# Patient Record
Sex: MALE | Race: WHITE | URBAN - METROPOLITAN AREA
[De-identification: names, ages, dates, MRNs, and addresses within clinical notes are randomized per-mention and may not be internally consistent; named-entity substitution may affect disease eponyms.]

---

## 2021-05-24 ENCOUNTER — APPOINTMENT (RX ONLY)
Dept: URBAN - METROPOLITAN AREA CLINIC 312 | Facility: CLINIC | Age: 66
Setting detail: DERMATOLOGY
End: 2021-05-24

## 2021-05-24 DIAGNOSIS — Z85.828 PERSONAL HISTORY OF OTHER MALIGNANT NEOPLASM OF SKIN: ICD-10-CM

## 2021-05-24 DIAGNOSIS — L90.5 SCAR CONDITIONS AND FIBROSIS OF SKIN: ICD-10-CM

## 2021-05-24 DIAGNOSIS — Z85.820 PERSONAL HISTORY OF MALIGNANT MELANOMA OF SKIN: ICD-10-CM

## 2021-05-24 DIAGNOSIS — L82.1 OTHER SEBORRHEIC KERATOSIS: ICD-10-CM

## 2021-05-24 DIAGNOSIS — L57.0 ACTINIC KERATOSIS: ICD-10-CM

## 2021-05-24 PROBLEM — C44.719 BASAL CELL CARCINOMA OF SKIN OF LEFT LOWER LIMB, INCLUDING HIP: Status: ACTIVE | Noted: 2021-05-24

## 2021-05-24 PROCEDURE — ? LIQUID NITROGEN

## 2021-05-24 PROCEDURE — 99213 OFFICE O/P EST LOW 20 MIN: CPT | Mod: 25

## 2021-05-24 PROCEDURE — 17003 DESTRUCT PREMALG LES 2-14: CPT

## 2021-05-24 PROCEDURE — ? ADDITIONAL NOTES

## 2021-05-24 PROCEDURE — ? COUNSELING

## 2021-05-24 PROCEDURE — ? CONSULTATION FOR SURGERY

## 2021-05-24 PROCEDURE — 17000 DESTRUCT PREMALG LESION: CPT

## 2021-05-24 ASSESSMENT — LOCATION ZONE DERM
LOCATION ZONE: SCALP
LOCATION ZONE: TRUNK
LOCATION ZONE: SCALP

## 2021-05-24 ASSESSMENT — LOCATION DETAILED DESCRIPTION DERM
LOCATION DETAILED: RIGHT CLAVICULAR SKIN
LOCATION DETAILED: LEFT SUPERIOR PARIETAL SCALP
LOCATION DETAILED: POSTERIOR MID-PARIETAL SCALP
LOCATION DETAILED: LEFT SUPERIOR UPPER BACK
LOCATION DETAILED: POSTERIOR MID-PARIETAL SCALP
LOCATION DETAILED: RIGHT SUPERIOR PARIETAL SCALP
LOCATION DETAILED: RIGHT MID-UPPER BACK

## 2021-05-24 ASSESSMENT — LOCATION SIMPLE DESCRIPTION DERM
LOCATION SIMPLE: RIGHT CLAVICULAR SKIN
LOCATION SIMPLE: LEFT UPPER BACK
LOCATION SIMPLE: POSTERIOR SCALP
LOCATION SIMPLE: SCALP
LOCATION SIMPLE: RIGHT UPPER BACK
LOCATION SIMPLE: POSTERIOR SCALP

## 2021-05-24 NOTE — PROCEDURE: CONSULTATION FOR SURGERY
Detail Level: Detailed
X Size Of Lesion In Cm (Optional): 2.5
Anatomic Location From Referring Provider: left lateral lower leg
Date Scheduled For Excision (This Is Optional): 6/2/2021
Incorporate Mauc In Note: Yes

## 2021-06-02 ENCOUNTER — APPOINTMENT (RX ONLY)
Dept: URBAN - METROPOLITAN AREA CLINIC 312 | Facility: CLINIC | Age: 66
Setting detail: DERMATOLOGY
End: 2021-06-02

## 2021-06-02 PROBLEM — C44.719 BASAL CELL CARCINOMA OF SKIN OF LEFT LOWER LIMB, INCLUDING HIP: Status: ACTIVE | Noted: 2021-06-02

## 2021-06-02 PROCEDURE — 13121 CMPLX RPR S/A/L 2.6-7.5 CM: CPT | Mod: 79

## 2021-06-02 PROCEDURE — ? REPAIR NOTE

## 2021-06-02 PROCEDURE — ? ADDITIONAL NOTES

## 2021-06-02 PROCEDURE — 17313 MOHS 1 STAGE T/A/L: CPT | Mod: 79

## 2021-06-02 PROCEDURE — 17314 MOHS ADDL STAGE T/A/L: CPT | Mod: 79

## 2021-06-02 PROCEDURE — ? MOHS SURGERY

## 2021-06-02 NOTE — PROCEDURE: REPAIR NOTE
Unique Flap 1 Text: Given the location of the defect, shape of the defect and the proximity to free margins a scalp rotation flap was deemed most appropriate.  Using a sterile surgical marker, an appropriate rotation flap was drawn incorporating the defect and placing the expected incisions away from existing scalp scars.   The area thus outlined was incised deep to galea  with a #15 scalpel blade.  The skin margins were undermined in the subgaleal plane to an appropriate distance in all directions using a scalpel.  The SCD at the rotation point was marked and excised.

## 2021-06-02 NOTE — PROCEDURE: REPAIR NOTE
Unique Flap 2 Text: Given the location of the defect and the proximity to the lower eyelid a laterally and inferiorly-based rotation flap was deemed most appropriate to minimize the possibility of ectropion.  Using a sterile surgical marker, a rotation flap was drawn from the superolateral aspect of the defect laterally and superiorly, with the Burow's triangle placed within a prominent crow's foot line. .    The area thus outlined was incised with a #15 scalpel blade and elevated just above the orbicularis oculi muscle.  The skin margins were undermined in the subcutaneous plane to an appropriate distance in all directions utilizing iris scissors.  The SCD at the rotation point inferiorly was marked and excised.

## 2021-06-02 NOTE — PROCEDURE: REPAIR NOTE
O-T Advancement Flap Text: The defect edges were debeveled with a #15 scalpel blade.  Given the location of the defect, shape of the defect and the proximity to free margins an O-T advancement flap was deemed most appropriate.  Using a sterile surgical marker, an appropriate advancement flap was drawn, placing the inferior horizontal incision at the inferior border of the tip aesthetic unit continuing into the medial alar crease on each side.  The SCD superiorly was marked for excision.   The area thus outlined was incised deep to adipose tissue with a #15 scalpel blade.  The skin margins were undermined to an appropriate distance in all directions at the level of the perichondrium, undermining superiorly along the dorsum to allow the entire flap to advance inferiorly.

## 2021-06-02 NOTE — HPI: PROCEDURE (SKIN SURGERY)
Has The Growth Been Previously Biopsied?: has been previously biopsied
Body Location Override (Optional): left lateral lower leg

## 2021-06-02 NOTE — PROCEDURE: REPAIR NOTE
V-Y Flap Text: Given the location of the defect, shape of the defect and the proximity to free margins a V-Y flap was deemed most appropriate.  Using a sterile surgical marker, an appropriate advancement flap was drawn incorporating the defect and placing the expected incisions within the relaxed skin tension lines where possible, with the medial line at the nasal/cheek junction.    The area thus outlined was incised deep to adipose tissue with a #15 scalpel blade.  The tissues were spread with curved iris scissors to release fibrous bands while preserving neurovascular structures, until the flap could be advanced without undue tension.

## 2021-06-02 NOTE — PROCEDURE: REPAIR NOTE
Keystone Flap Text: Given the location of the defect and inability to close the wound without undue tension, a keystone flap was deemed most appropriate.  Using a sterile surgical marker, an appropriate keystone flap was outlined extending a line in a curvilinear fashion from the superior and inferior points of the defect.  The flap width was the same as the defect width.  After local anesthesia was obtained the flap was incised through dermis, leaving an intact 1 cm skin bridge at the base of the flap.  The subcutaneous tissues were spread with curved iris scissors and any tethering bands released until the flap could be advanced without undue tension.   Hemostasis was obtained with electrocautery where necessary.  The superior and inferior ends of the flap donor site were closed in a V to Y fashion to reduce tension on the closure.   Key sutures were placed in the dermis with 4-0 undyed Vicryl where the dermis was substantial enough to hold the suture, and the flap inset with interrupted and running 4-0 Nylon suture.

## 2021-06-02 NOTE — PROCEDURE: REPAIR NOTE
Bilobed Flap Text: Given the location of the defect and the sebaceous nature of the surrounding skin a bilobed flap was deemed most appropriate.  Using a sterile surgical marker, an appropriate bilobed flap was drawn around the defect, with the primary lobe the same width as the defect, and the secondary lobe half the width.  The SCD laterally was marked for excision using the Zitelli modification.    The area thus outlined was incised deep to adipose tissue with a #15 scalpel blade.  The skin margins were undermined to an appropriate distance in all directions at the level of the perichondrium utilizing iris scissors.  The edges of the defect medially and inferiorly were undermined for at least a centimeter.

## 2021-06-02 NOTE — PROCEDURE: REPAIR NOTE
Dorsal Nasal Flap Text: Given the location and size of the defect and the proximity to free margins a dorsal nasal flap was deemed most appropriate.  Using a sterile surgical marker, an appropriate dorsal nasal flap was drawn around the defect, beginning on the left side extending superiorly to the nasion, with a back-cut in the nasion.    The area thus outlined was incised deep to adipose tissue with a #15 scalpel blade.  The skin margins were undermined to an appropriate distance in all directions utilizing iris scissors at the level of the periosteum.

## 2021-06-02 NOTE — PROCEDURE: REPAIR NOTE
Post-Care Instructions: The patient tolerated the operation well without complication and was discharged in stable condition with a suture line care instruction sheet including my cell phone number. I reviewed with the patient in detail post-care instructions. Should the patient develop any fevers, chills, bleeding, or severe pain the patient will contact me or the office immediately.

## 2021-06-02 NOTE — PROCEDURE: REPAIR NOTE
Paramedian Forehead Flap Text: A decision was made to reconstruct the defect utilizing a paramedian forehead flap and a staged reconstruction.  A template of the defect was made with a suture pack lining and transferred to the right upper forehead.  The right medial canthus was noted and the pedicle of the flap centered just lateral to this point at the medial eyebrow with a width of 15 mm, centered about a point 12 mm from the midline. The flap was anesthetized with 5 cc of 1% lido with epinephrine. The flap was then incised and elevated from superior to inferior, starting in the deep subcutaneous plane and deepening the dissection inferiorly to the periosteum.  The dissection proceeded inferiorly to the orbital rim at which point the flap could be transposed into the defect without tension.  Hemostasis was assured and the donor site was closed with interrupted 4-0 Vicryl for deep dermis and a running 6-0 Prolene suture for skin with care being taken not to compress the pedicle. The flap was transposed into the defect and inset with interrupted 5-0 Monocryl suture for deep dermis, especially along the leading edge followed by interrupted 6-0 Prolene sutures for skin. . \\nSurgicel and Gelfoam was applied to the raw surface of the pedicle, followed by a mild compression dressing to the nose and forehead.\\n At the conclusion of the procedure the flap was healthy with no evidence of vascular compromise.  The suture line was coated with mupirocin ointment and a sterile dressing applied.

## 2021-06-02 NOTE — PROCEDURE: REPAIR NOTE
Unique Flap 3 Text: I discussed the benefits and risks of the procedure including infection, bleeding, hematoma, seroma, graft loss either partial or complete, poor color and/or texture match, hypertrophic scarring, contour abnormality, recurrence of tumor, and the possibility of additional surgery depending on the results of healing.  All questions were answered to the patient's satisfaction and the patient wished to proceed with the planned operation.\\n\\n The patient was placed on the operating table in the supine position and the right ear prepped and draped in the usual sterile fashion including the conchal bowl donor site. The defect was anesthetized with 1% lidocaine with epinephrine, using a total of 1 cc.  A template of the defect was made and transferred to the conchal bowl.  This donor site was then anesthetized with 1% lidocaine with epi, using a total of 1 cc.  The graft was excised off the underlying cartilage. Hemostasis was assured with electrocautery and the donor site dressed with Gelfoam and a pressure dressing.\\nThe graft was inset around the perimeter with a running 6-0 Prolene suture. Additional quilting 6-0 Prolene sutures were placed to approximate the graft to the underlying bed.  The graft was covered with Xeroform followed by dry cotton, and a weaving 6-0 Prolene suture tied over this to serve as a bolster dressing.  This was further reinforced with gauze and tape.  \\n\\nThe patient tolerated the operation well without complication and was discharged in stable condition with a suture line care instruction sheet.  Follow-up is scheduled for approximately 1 week for suture removal and takedown of the bolster dressing.\\n

## 2021-06-02 NOTE — PROCEDURE: REPAIR NOTE
Consent: The rationale for the repair was explained to the patient and consent was obtained. The risks, benefits and alternatives to therapy were discussed in detail. Specifically, the risks of infection, scarring, bleeding, prolonged wound healing, incomplete removal, allergy to anesthesia, nerve injury, contour abnormality, recurrence, and the possbility of additional surgery depending on the results of healing were addressed. Prior to the procedure, the treatment site was clearly identified and confirmed by the patient. The Mohs surgeon confirmed that the margins are clear prior to my reconstruction. All components of Universal Protocol/PAUSE Rule completed.

## 2021-06-02 NOTE — PROCEDURE: REPAIR NOTE
Unique Flap 4 Text: Given the location and shape of the defect and the proximity to the eyebrow a forehead rotation flap was deemed most appropriate.  Using a sterile surgical marker, an appropriate rotation flap was drawn from the inferolateral aspect of the defect laterally in the horizontal RSTL of the forehead, extending into the temporal scalp. The area thus outlined was incised with a #15 scalpel blade and the flap elevated in the plane just superficial to the frontalis muscle. The skin margins were undermined in the subcutaneous plane to an appropriate distance in all directions utilizing iris scissors.  The SCD at the rotation point superiorly was marked and excised.

## 2021-06-02 NOTE — PROCEDURE: REPAIR NOTE
Spiral Flap Text: Given the location of the defect involving the ala and a portion of the sidewall superior to the alar crease, shape of the defect and the proximity to free margins a spiral flap was deemed most appropriate.  Using a sterile surgical marker, an appropriate rotation flap was drawn beginning at the inferior point of the defect, with the width of the flap at the level of the mid-portion of the defect equal to the height desired between the inferior border of the defect and the new alar crease, using a logarithmic spiral extending superiorly and laterally. The area thus outlined was incised deep to adipose tissue to equal the depth of the defect with a #15 scalpel blade.  The skin margins were undermined to an appropriate distance in all directions at the level of the perichondrium utilizing iris scissors, with minimal undermining laterally to preserve the myocutaneous flap perfusion.

## 2021-06-02 NOTE — PROCEDURE: REPAIR NOTE
Posterior Auricular Interpolation Flap Text: A decision was made to reconstruct the defect utilizing an interpolation axial flap and a staged reconstruction.  A flap the same width as the defect was outlined, based laterally and utilizing the glabrous skin of the mastoid and postauricular crease.  The donor area for the pedicle flap was then injected with anesthesia.  The flap was excised through the skin and subcutaneous tissue down to the layer of the underlying musculature.  The pedicle flap was carefully incised within this deep plane to maintain its blood supply.  The edges of the donor site were undermined.   The donor site was partially closed in a primary fashion with 5-0 Prolene.  The pedicle was then advanced into position and sutured.  Once the flap was sutured into place, adequate blood supply was confirmed with blanching and refill.  The remaining raw surface of the donor site was then dressed with Gelfoam, and the superior and inferior borders of the helical rim sutured to the mastoid with 5-0 Prolene to alleviate any tension on the flap. Mupirocin and a gauze bandage was placed for any post-operative drainage while assuring there was no pressure on the flap pedicle.

## 2021-06-02 NOTE — PROCEDURE: REPAIR NOTE
Rotation Flap Text: Given the location of the defect, shape of the defect and the proximity to free margins a rotation flap was deemed most appropriate.  Using a sterile surgical marker, an appropriate rotation flap was drawn incorporating the defect and designing a rotation flap based laterally extending to the nasal/cheek junction, with a Burow's triangle in this sulcus.   The area thus outlined was incised through dermis with a #15 scalpel blade.  The skin margins were undermined in the subcutaneous plane to an appropriate distance in all directions utilizing iris scissors.  The SCD at the rotation point was marked and excised.

## 2021-06-02 NOTE — PROCEDURE: REPAIR NOTE
Advancement Flap (Single) Text: The defect edges were debeveled with a #15 scalpel blade.    Using a sterile surgical marker, an appropriate advancement flap was drawn incorporating the defect and placing the expected incisions within the relaxed skin tension lines where possible.    The area thus outlined was incised deep to adipose tissue with a #15 scalpel blade and elevated in the plane just superficial to the frontalis muscle.  The skin margins were undermined to an appropriate distance in all directions utilizing iris scissors.

## 2021-06-02 NOTE — PROCEDURE: MOHS SURGERY
Body Location Override (Optional - Billing Will Still Be Based On Selected Body Map Location If Applicable): left lateral lower leg

## 2021-06-02 NOTE — PROCEDURE: REPAIR NOTE
Ftsg Text: Given the location of the defect, shape of the defect and the proximity to free margins a full thickness skin graft was deemed most appropriate.  Using a sterile surgical marker, the primary defect shape was transferred to the donor site. The area thus outlined was incised deep to adipose tissue with a #15 scalpel blade.  The harvested graft was then trimmed of adipose tissue until only dermis and epidermis was left. The graft was pierced multiple times with a 19 gauge needle to allow for egress of fluids. The skin graft was then placed in the primary defect and oriented appropriately.  After insetting of the graft a through-and-through 6-0 Prolene quilting suture was placed to approximate the graft to the underlying bed.  The graft was dressed with Xeroform, dry gauze, and the bolster secured with a running through-and-through 6-0 Prolene.

## 2021-06-16 ENCOUNTER — APPOINTMENT (RX ONLY)
Dept: URBAN - METROPOLITAN AREA CLINIC 312 | Facility: CLINIC | Age: 66
Setting detail: DERMATOLOGY
End: 2021-06-16

## 2021-06-16 DIAGNOSIS — Z48.02 ENCOUNTER FOR REMOVAL OF SUTURES: ICD-10-CM

## 2021-06-16 PROCEDURE — 99024 POSTOP FOLLOW-UP VISIT: CPT

## 2021-06-16 PROCEDURE — ? SUTURE REMOVAL (GLOBAL PERIOD)

## 2021-06-16 ASSESSMENT — LOCATION DETAILED DESCRIPTION DERM: LOCATION DETAILED: LEFT DISTAL MEDIAL CALF

## 2021-06-16 ASSESSMENT — LOCATION ZONE DERM: LOCATION ZONE: LEG

## 2021-06-16 ASSESSMENT — LOCATION SIMPLE DESCRIPTION DERM: LOCATION SIMPLE: LEFT CALF

## 2021-06-16 NOTE — PROCEDURE: SUTURE REMOVAL (GLOBAL PERIOD)
Detail Level: Simple
Add 86230 Cpt? (Important Note: In 2017 The Use Of 40906 Is Being Tracked By Cms To Determine Future Global Period Reimbursement For Global Periods): yes

## 2021-11-01 ENCOUNTER — APPOINTMENT (RX ONLY)
Dept: URBAN - METROPOLITAN AREA CLINIC 312 | Facility: CLINIC | Age: 66
Setting detail: DERMATOLOGY
End: 2021-11-01

## 2021-11-01 DIAGNOSIS — L57.0 ACTINIC KERATOSIS: ICD-10-CM

## 2021-11-01 DIAGNOSIS — L90.5 SCAR CONDITIONS AND FIBROSIS OF SKIN: ICD-10-CM

## 2021-11-01 DIAGNOSIS — D17 BENIGN LIPOMATOUS NEOPLASM: ICD-10-CM

## 2021-11-01 DIAGNOSIS — Z86.006 PERSONAL HISTORY OF MELANOMA IN-SITU: ICD-10-CM

## 2021-11-01 DIAGNOSIS — Z85.828 PERSONAL HISTORY OF OTHER MALIGNANT NEOPLASM OF SKIN: ICD-10-CM

## 2021-11-01 PROBLEM — Z85.820 PERSONAL HISTORY OF MALIGNANT MELANOMA OF SKIN: Status: ACTIVE | Noted: 2021-11-01

## 2021-11-01 PROBLEM — D17.1 BENIGN LIPOMATOUS NEOPLASM OF SKIN AND SUBCUTANEOUS TISSUE OF TRUNK: Status: ACTIVE | Noted: 2021-11-01

## 2021-11-01 PROCEDURE — 17003 DESTRUCT PREMALG LES 2-14: CPT

## 2021-11-01 PROCEDURE — 17000 DESTRUCT PREMALG LESION: CPT

## 2021-11-01 PROCEDURE — ? LIQUID NITROGEN

## 2021-11-01 PROCEDURE — ? COUNSELING

## 2021-11-01 PROCEDURE — ? ADDITIONAL NOTES

## 2021-11-01 PROCEDURE — 99213 OFFICE O/P EST LOW 20 MIN: CPT | Mod: 25

## 2021-11-01 ASSESSMENT — LOCATION SIMPLE DESCRIPTION DERM
LOCATION SIMPLE: SCALP
LOCATION SIMPLE: NOSE
LOCATION SIMPLE: LEFT CHEEK
LOCATION SIMPLE: RIGHT FOREHEAD
LOCATION SIMPLE: GROIN
LOCATION SIMPLE: RIGHT CHEEK
LOCATION SIMPLE: RIGHT EAR
LOCATION SIMPLE: LEFT PRETIBIAL REGION
LOCATION SIMPLE: FRONTAL SCALP
LOCATION SIMPLE: LEFT EAR

## 2021-11-01 ASSESSMENT — LOCATION ZONE DERM
LOCATION ZONE: SCALP
LOCATION ZONE: EAR
LOCATION ZONE: SCALP
LOCATION ZONE: LEG
LOCATION ZONE: TRUNK
LOCATION ZONE: NOSE
LOCATION ZONE: FACE

## 2021-11-01 ASSESSMENT — LOCATION DETAILED DESCRIPTION DERM
LOCATION DETAILED: LEFT LATERAL MALAR CHEEK
LOCATION DETAILED: LEFT DISTAL PRETIBIAL REGION
LOCATION DETAILED: RIGHT INFERIOR LATERAL MALAR CHEEK
LOCATION DETAILED: RIGHT SCAPHA
LOCATION DETAILED: LEFT PROXIMAL PRETIBIAL REGION
LOCATION DETAILED: LEFT TRAGUS
LOCATION DETAILED: RIGHT SUPERIOR CENTRAL MALAR CHEEK
LOCATION DETAILED: LEFT INFERIOR PREAURICULAR CHEEK
LOCATION DETAILED: NASAL SUPRATIP
LOCATION DETAILED: SUPRAPUBIC SKIN
LOCATION DETAILED: LEFT INFERIOR POSTAURICULAR SKIN
LOCATION DETAILED: MEDIAL FRONTAL SCALP
LOCATION DETAILED: RIGHT INFERIOR LATERAL FOREHEAD

## 2021-11-01 NOTE — PROCEDURE: LIQUID NITROGEN
Detail Level: Detailed
Render Note In Bullet Format When Appropriate: No
Show Aperture Variable?: Yes
Consent: The patient's consent was obtained including but not limited to risks of crusting, scabbing, blistering, scarring, darker or lighter pigmentary change, recurrence, incomplete removal and infection.
Post-Care Instructions: I reviewed with the patient in detail post-care instructions. Patient is to wear sunprotection, and avoid picking at any of the treated lesions. Pt may apply Vaseline to crusted or scabbing areas.
Duration Of Freeze Thaw-Cycle (Seconds): 0